# Patient Record
(demographics unavailable — no encounter records)

---

## 2025-05-21 NOTE — DISCUSSION/SUMMARY
[FreeTextEntry1] : 73-year-old female right-hand-dominant with history of dizziness and chronic cervicalgia radiating to left shoulder with paresthesia dysesthesias without weakness MRA of head and neck unremarkable. MRI brain with IACs unremarkable.  MRI of cervical spine shows multilevel degenerative changes C5-6 most significantly.  Having headaches, likely cervicalgia.     Management of neck muscle spasm and cervicalgia:  Physical therapy outpatient including postural modification, strengthening, and range of motion exercises.  Education on proper sitting and sleeping position. Can consider chiropractic services with caution of manipulation of the neck. Massage therapy regularly. Alternating heat with cold packs Stretching exercises including neck rotation, neck tilting, scapular retraction, chin retraction, with particular focus on the trapezius and rotator cuff.  Can also consider muscle relaxant such as Flexeril 5mg PRN TID with caution to sedation and medication interactions.  Yoga, Mika chi, or CBT are other useful therapies.  Can consider needling or trigger point injection inpatient or outpatient.  2. Continue Physical therapy for the neck   3. tizanidine 2 mg 1 to 2 tablets at bedtime for cervical muscle spasms. She also has a compound cream that was prescribed, or can try diclofenac cream.   4. Continue magnesium, she takes slow-mag. Try for at least 400mg.     Return to clinic in 6 months for follow-up evaluation 3-4 months

## 2025-05-21 NOTE — PHYSICAL EXAM
[General Appearance - Alert] : alert [General Appearance - In No Acute Distress] : in no acute distress [General Appearance - Well Nourished] : well nourished [General Appearance - Well Developed] : well developed [Oriented To Time, Place, And Person] : oriented to person, place, and time [Affect] : the affect was normal [Mood] : the mood was normal [Cranial Nerves Optic (II)] : visual acuity intact bilaterally,  visual fields full to confrontation, pupils equal round and reactive to light [Cranial Nerves Oculomotor (III)] : extraocular motion intact [Cranial Nerves Trigeminal (V)] : facial sensation intact symmetrically [Cranial Nerves Facial (VII)] : face symmetrical [Cranial Nerves Vestibulocochlear (VIII)] : hearing was intact bilaterally [Cranial Nerves Glossopharyngeal (IX)] : tongue and palate midline [Cranial Nerves Accessory (XI - Cranial And Spinal)] : head turning and shoulder shrug symmetric [Cranial Nerves Hypoglossal (XII)] : there was no tongue deviation with protrusion [Motor Tone] : muscle tone was normal in all four extremities [Motor Strength] : muscle strength was normal in all four extremities [Involuntary Movements] : no involuntary movements were seen [No Muscle Atrophy] : normal bulk in all four extremities [Motor Handedness Right-Handed] : the patient is right hand dominant [Sensation Tactile Decrease] : light touch was intact [Abnormal Walk] : normal gait [Balance] : balance was intact [2+] : Patella left 2+ [PERRL With Normal Accommodation] : pupils were equal in size, round, reactive to light, with normal accommodation [Extraocular Movements] : extraocular movements were intact [Hearing Threshold Finger Rub Not [place]] : hearing was normal [Neck Appearance] : the appearance of the neck was normal [Exaggerated Use Of Accessory Muscles For Inspiration] : no accessory muscle use [Abdomen Soft] : soft [Abdomen Tenderness] : non-tender [Nail Clubbing] : no clubbing  or cyanosis of the fingernails [Skin Color & Pigmentation] : normal skin color and pigmentation [] : no rash [Skin Lesions] : no skin lesions [Motor Strength Upper Extremities Bilaterally] : strength was normal in both upper extremities [Motor Strength Lower Extremities Bilaterally] : strength was normal in both lower extremities [Romberg's Sign] : Romberg's sign was negtive [Tremor] : no tremor present

## 2025-05-21 NOTE — DATA REVIEWED
[de-identified] : MRI head non-contrast 7/1/2024: Anterior circulation normal flow related signal is identified in the intracranial segment of both internal carotid arteries and their major branches.  There is no glossy stenotic or occlusive lesion.  There is no evidence of aneurysm.  Posterior circulation there is normal flow related signal identified in the vertebrobasilar system.  There is no grossly stenotic or occlusive lesion.  There is no evidence of aneurysm.  There is fetal circulation of the posterior cerebral arteries.  The right vertebral artery terminates at the right posterior inferior cerebellar artery.  Diffusion-weighted imaging of the brain demonstrates no evidence of acute infarction.  Impression normal MRA of the head.  MRA neck with and without IV contrast 7/1/2024: Carotid arteries there is no evidence of significant stenosis involving the visualized portion of the common or internal carotid arteries on either side.  Vertebral arteries flow in the normal antegrade direction in the vertebral arteries bilaterally.  No significant stenosis is appreciated to the visualized portion of the vertebral arteries.  The right vertebral artery terminates at the right posterior inferior cerebellar artery.  There is a dominant left vertebral artery.  Aortic arch there is a standard configuration to the aortic arch without significant stenosis at the origins of the great vessels.  Other findings diffusion-weighted imaging of the brain was obtained and demonstrates no evidence of acute infarction.  Impression normal MRA of neck.  MRI cervical spine noncontrast 6/29/2024: No fractures lytic or blastic disease is demonstrated.  The cervical spinal cord is normal in signal.  No abnormal intradural filling defects are demonstrated.  At C2-3 the canal and foramen are clear, unchanged since the prior exam.  At C3-4 hypertrophic degenerative facet arthritis on the left side is present and may be causing and mild left foraminal stenosis.  The canal and the right foramen are clear.  The appearance is unchanged since the previous exam.  At C4-5 heterotrophic degenerative facet arthritis on the left side is present and is associated with a very subtle degenerative spondylolisthesis of C4 anteriorly on C5.  Nevertheless the canal and the foramen are clear and the appearance is unchanged since the previous exam.  At C5-6 the disc is reduced in height.  Degenerative disc osteophyte projects across the ventral epidural space and cause a ventral epidural defect on the thecal sac.  The degenerative disease also causes bilateral high-grade foraminal stenosis.  The appearance is unchanged since prior exam.  C6-7 subtle grade 1 degenerative spondylolisthesis and C6 anteriorly on C7  is present.  Visualized the canal and foramen are clear and the appearance is unchanged since previous exam.  At C7-T1 the canal and the foramen are clear.  The appearance is unchanged since the previous exam.  Impression multilevel degenerative disc disease most notably at C5-6.  MRI brain and IACs with and without IV contrast 12/28/2023: 7th and 8th cranial nerves and the cerebellopontine angle cisterns appear intact bilaterally.  No abnormal signal or enhancement is noted to suggest a vestibular schwannoma.  There are no findings to suggest an inner ear abnormality.  The brain parenchyma is unremarkable.  There is no acute intracranial hemorrhage or mass lesion.  No restricted diffusion is seen to suggest acute infarction.  Extra-axial spaces are unremarkable.  Ventricles and sulci are normal for age.  Major cerebral blood vessels flow voids are intact.  Paranasal sinuses mastoid air cells minimal opacification in the left mastoid tip.  Orbits are unremarkable impression normal brain MRI.

## 2025-05-21 NOTE — HISTORY OF PRESENT ILLNESS
[FreeTextEntry1] : May 21: 73-year-old woman here for follow-up.  Previously seen July 2024 by Miguel Hackett: She wants to know why she had the vertigo. She frequently has headaches, almost every day. She goes to PT and does exercises daily. She has some symptoms of nasal congestion. She has some frontal pressure today. Her sister has had two episodes of TGA, and she is concerned that she will have this. She will occasionally have numbness in the 3rd and 4th digits, primarily in the morning. She sleeps on a memory foam and has a seed pillow, this helps keep her comfortable.   73-year-old female right-hand-dominant returns to clinic with  for follow-up evaluation for dizziness and chronic neck pain and stiffness.  Patient states the dizziness has resolved. Cervicalgia is located along the midline of the C-spine with mild radiation to L shoulder.  Pain is described as a dull pain that waxes and wanes with activity with severity rated 5-6/10. Associated symptoms include paresthesia described as numbness and tingling, dysesthesias with occasional burning and electrical shocks without weakness. Patient has participated in physical therapy in the past with palliative relief.  MRI head non-contrast 7/1/2024: Impression normal MRA of the head. MRA neck with and without IV contrast 7/1/2024: Impression normal MRA of neck. MRI cervical spine non-contrast 6/29/2024: Impression multilevel degenerative disc disease most notably at C5-6. MRI brain and IACs with and without IV contrast 12/28/2023: impression normal brain MRI.

## 2025-05-21 NOTE — REVIEW OF SYSTEMS
[Numbness] : numbness [Tingling] : tingling [Abnormal Sensation] : an abnormal sensation [Dizziness] : dizziness [Vertigo] : vertigo [Arthralgias] : arthralgias [Joint Pain] : joint pain [Fever] : no fever [Chills] : no chills [Feeling Tired] : not feeling tired [Confused or Disoriented] : no confusion [Memory Lapses or Loss] : no memory loss [Decr. Concentrating Ability] : no decrease in concentrating ability [Difficulty with Language] : no ~M difficulty with language [Facial Weakness] : no facial weakness [Arm Weakness] : no arm weakness [Hand Weakness] : no hand weakness [Leg Weakness] : no leg weakness [Hypersensitivity] : no hypersensitivity [Seizures] : no convulsions [Fainting] : no fainting [Lightheadedness] : no lightheadedness [Migraine Headache] : no migraine headache [Difficulty Walking] : no difficulty walking [Inability to Walk] : able to walk [Sleep Disturbances] : no sleep disturbances [Anxiety] : no anxiety [Depression] : no depression [Eye Pain] : no eye pain [Loss Of Hearing] : no hearing loss [Chest Pain] : no chest pain [Palpitations] : no palpitations [Shortness Of Breath] : no shortness of breath [Abdominal Pain] : no abdominal pain [Dysuria] : no dysuria [Skin Lesions] : no skin lesions [Skin Wound] : no skin wound [Easy Bleeding] : no tendency for easy bleeding [Easy Bruising] : no tendency for easy bruising

## 2025-07-16 NOTE — PHYSICAL EXAM
[General Appearance - Alert] : alert [General Appearance - In No Acute Distress] : in no acute distress [General Appearance - Well Nourished] : well nourished [General Appearance - Well Developed] : well developed [Oriented To Time, Place, And Person] : oriented to person, place, and time [Affect] : the affect was normal [Mood] : the mood was normal [Cranial Nerves Optic (II)] : visual acuity intact bilaterally,  visual fields full to confrontation, pupils equal round and reactive to light [Cranial Nerves Oculomotor (III)] : extraocular motion intact [Cranial Nerves Trigeminal (V)] : facial sensation intact symmetrically [Cranial Nerves Facial (VII)] : face symmetrical [Cranial Nerves Vestibulocochlear (VIII)] : hearing was intact bilaterally [Cranial Nerves Glossopharyngeal (IX)] : tongue and palate midline [Cranial Nerves Accessory (XI - Cranial And Spinal)] : head turning and shoulder shrug symmetric [Cranial Nerves Hypoglossal (XII)] : there was no tongue deviation with protrusion [Motor Tone] : muscle tone was normal in all four extremities [Motor Strength] : muscle strength was normal in all four extremities [Involuntary Movements] : no involuntary movements were seen [No Muscle Atrophy] : normal bulk in all four extremities [Motor Handedness Right-Handed] : the patient is right hand dominant [Sensation Tactile Decrease] : light touch was intact [Abnormal Walk] : normal gait [Balance] : balance was intact [2+] : Patella left 2+ [PERRL With Normal Accommodation] : pupils were equal in size, round, reactive to light, with normal accommodation [Extraocular Movements] : extraocular movements were intact [Hearing Threshold Finger Rub Not [place]] : hearing was normal [Neck Appearance] : the appearance of the neck was normal [Exaggerated Use Of Accessory Muscles For Inspiration] : no accessory muscle use [Abdomen Soft] : soft [Abdomen Tenderness] : non-tender [Nail Clubbing] : no clubbing  or cyanosis of the fingernails [Skin Color & Pigmentation] : normal skin color and pigmentation [] : no rash [Skin Lesions] : no skin lesions [Motor Strength Upper Extremities Bilaterally] : strength was normal in both upper extremities [Motor Strength Lower Extremities Bilaterally] : strength was normal in both lower extremities [Romberg's Sign] : Romberg's sign was negtive [Tremor] : no tremor present

## 2025-07-16 NOTE — ASSESSMENT
[FreeTextEntry1] : 74-year-old female right-hand-dominant with history of dizziness and chronic cervicalgia radiating to left shoulder with paresthesia dysesthesias without weakness. MRI of cervical spine shows multilevel degenerative changes C5-6 most significantly. She has significant neck stiffness and muscle spasm likely causing cervicalgia. She has intermittent brief episodes of dizziness. Discussed this could be related to a number of things.    Management of neck muscle spasm and cervicalgia:  Physical therapy outpatient including postural modification, strengthening, and range of motion exercises. Education on proper sitting and sleeping position. Can consider chiropractic services with caution of manipulation of the neck. Massage therapy regularly. Alternating heat with cold packs Stretching exercises including neck rotation, neck tilting, scapular retraction, chin retraction, with particular focus on the trapezius and rotator cuff. Can also consider muscle relaxant such as Flexeril 5mg PRN TID with caution to sedation and medication interactions. Yoga, Mika chi, or CBT are other useful therapies. Can consider needling or trigger point injection inpatient or outpatient.  2. Continue Physical therapy for the neck. Acupuncture and massage gun regularly  3. tizanidine 2 mg 1 to 2 tablets at bedtime for cervical muscle spasms. She also has a compound cream that was prescribed, or can try diclofenac cream.  4. Continue magnesium, she takes slow-mag 400mg.  Follow up in 3-4 months

## 2025-07-16 NOTE — DATA REVIEWED
[de-identified] : MRI head non-contrast 7/1/2024: Anterior circulation normal flow related signal is identified in the intracranial segment of both internal carotid arteries and their major branches.  There is no glossy stenotic or occlusive lesion.  There is no evidence of aneurysm.  Posterior circulation there is normal flow related signal identified in the vertebrobasilar system.  There is no grossly stenotic or occlusive lesion.  There is no evidence of aneurysm.  There is fetal circulation of the posterior cerebral arteries.  The right vertebral artery terminates at the right posterior inferior cerebellar artery.  Diffusion-weighted imaging of the brain demonstrates no evidence of acute infarction.  Impression normal MRA of the head.  MRA neck with and without IV contrast 7/1/2024: Carotid arteries there is no evidence of significant stenosis involving the visualized portion of the common or internal carotid arteries on either side.  Vertebral arteries flow in the normal antegrade direction in the vertebral arteries bilaterally.  No significant stenosis is appreciated to the visualized portion of the vertebral arteries.  The right vertebral artery terminates at the right posterior inferior cerebellar artery.  There is a dominant left vertebral artery.  Aortic arch there is a standard configuration to the aortic arch without significant stenosis at the origins of the great vessels.  Other findings diffusion-weighted imaging of the brain was obtained and demonstrates no evidence of acute infarction.  Impression normal MRA of neck.  MRI cervical spine noncontrast 6/29/2024: No fractures lytic or blastic disease is demonstrated.  The cervical spinal cord is normal in signal.  No abnormal intradural filling defects are demonstrated.  At C2-3 the canal and foramen are clear, unchanged since the prior exam.  At C3-4 hypertrophic degenerative facet arthritis on the left side is present and may be causing and mild left foraminal stenosis.  The canal and the right foramen are clear.  The appearance is unchanged since the previous exam.  At C4-5 heterotrophic degenerative facet arthritis on the left side is present and is associated with a very subtle degenerative spondylolisthesis of C4 anteriorly on C5.  Nevertheless the canal and the foramen are clear and the appearance is unchanged since the previous exam.  At C5-6 the disc is reduced in height.  Degenerative disc osteophyte projects across the ventral epidural space and cause a ventral epidural defect on the thecal sac.  The degenerative disease also causes bilateral high-grade foraminal stenosis.  The appearance is unchanged since prior exam.  C6-7 subtle grade 1 degenerative spondylolisthesis and C6 anteriorly on C7  is present.  Visualized the canal and foramen are clear and the appearance is unchanged since previous exam.  At C7-T1 the canal and the foramen are clear.  The appearance is unchanged since the previous exam.  Impression multilevel degenerative disc disease most notably at C5-6.  MRI brain and IACs with and without IV contrast 12/28/2023: 7th and 8th cranial nerves and the cerebellopontine angle cisterns appear intact bilaterally.  No abnormal signal or enhancement is noted to suggest a vestibular schwannoma.  There are no findings to suggest an inner ear abnormality.  The brain parenchyma is unremarkable.  There is no acute intracranial hemorrhage or mass lesion.  No restricted diffusion is seen to suggest acute infarction.  Extra-axial spaces are unremarkable.  Ventricles and sulci are normal for age.  Major cerebral blood vessels flow voids are intact.  Paranasal sinuses mastoid air cells minimal opacification in the left mastoid tip.  Orbits are unremarkable impression normal brain MRI.

## 2025-07-16 NOTE — DATA REVIEWED
[de-identified] : MRI head non-contrast 7/1/2024: Anterior circulation normal flow related signal is identified in the intracranial segment of both internal carotid arteries and their major branches.  There is no glossy stenotic or occlusive lesion.  There is no evidence of aneurysm.  Posterior circulation there is normal flow related signal identified in the vertebrobasilar system.  There is no grossly stenotic or occlusive lesion.  There is no evidence of aneurysm.  There is fetal circulation of the posterior cerebral arteries.  The right vertebral artery terminates at the right posterior inferior cerebellar artery.  Diffusion-weighted imaging of the brain demonstrates no evidence of acute infarction.  Impression normal MRA of the head.  MRA neck with and without IV contrast 7/1/2024: Carotid arteries there is no evidence of significant stenosis involving the visualized portion of the common or internal carotid arteries on either side.  Vertebral arteries flow in the normal antegrade direction in the vertebral arteries bilaterally.  No significant stenosis is appreciated to the visualized portion of the vertebral arteries.  The right vertebral artery terminates at the right posterior inferior cerebellar artery.  There is a dominant left vertebral artery.  Aortic arch there is a standard configuration to the aortic arch without significant stenosis at the origins of the great vessels.  Other findings diffusion-weighted imaging of the brain was obtained and demonstrates no evidence of acute infarction.  Impression normal MRA of neck.  MRI cervical spine noncontrast 6/29/2024: No fractures lytic or blastic disease is demonstrated.  The cervical spinal cord is normal in signal.  No abnormal intradural filling defects are demonstrated.  At C2-3 the canal and foramen are clear, unchanged since the prior exam.  At C3-4 hypertrophic degenerative facet arthritis on the left side is present and may be causing and mild left foraminal stenosis.  The canal and the right foramen are clear.  The appearance is unchanged since the previous exam.  At C4-5 heterotrophic degenerative facet arthritis on the left side is present and is associated with a very subtle degenerative spondylolisthesis of C4 anteriorly on C5.  Nevertheless the canal and the foramen are clear and the appearance is unchanged since the previous exam.  At C5-6 the disc is reduced in height.  Degenerative disc osteophyte projects across the ventral epidural space and cause a ventral epidural defect on the thecal sac.  The degenerative disease also causes bilateral high-grade foraminal stenosis.  The appearance is unchanged since prior exam.  C6-7 subtle grade 1 degenerative spondylolisthesis and C6 anteriorly on C7  is present.  Visualized the canal and foramen are clear and the appearance is unchanged since previous exam.  At C7-T1 the canal and the foramen are clear.  The appearance is unchanged since the previous exam.  Impression multilevel degenerative disc disease most notably at C5-6.  MRI brain and IACs with and without IV contrast 12/28/2023: 7th and 8th cranial nerves and the cerebellopontine angle cisterns appear intact bilaterally.  No abnormal signal or enhancement is noted to suggest a vestibular schwannoma.  There are no findings to suggest an inner ear abnormality.  The brain parenchyma is unremarkable.  There is no acute intracranial hemorrhage or mass lesion.  No restricted diffusion is seen to suggest acute infarction.  Extra-axial spaces are unremarkable.  Ventricles and sulci are normal for age.  Major cerebral blood vessels flow voids are intact.  Paranasal sinuses mastoid air cells minimal opacification in the left mastoid tip.  Orbits are unremarkable impression normal brain MRI.

## 2025-07-16 NOTE — HISTORY OF PRESENT ILLNESS
[FreeTextEntry1] : July 16, 2025: She had an episode of dizziness or feeling out of it. She was putting cat food down. She felt dizzy, had to stand still for a second, went away quickly.  She was having nasal congestion, taking honey daily helped. Her headaches also stopped. She mouth breaths at night and grinds her teeth. Per  she almost never snores. She wakes with dry mouth. She hasn't had a chance to try massage for her neck pain. Her PCP had ordered a compound cream for her but she has not heard back yet.   May 21: 73-year-old woman here for follow-up.  Previously seen July 2024 by Miguel Hackett: She wants to know why she had the vertigo. She frequently has headaches, almost every day. She goes to PT and does exercises daily. She has some symptoms of nasal congestion. She has some frontal pressure today. Her sister has had two episodes of TGA, and she is concerned that she will have this. She will occasionally have numbness in the 3rd and 4th digits, primarily in the morning. She sleeps on a memory foam and has a seed pillow, this helps keep her comfortable. Her neck is still stiff but she has a bit more flexibility doing PT. Per PCP sent in a cream from a compounding pharmacy. She currently uses voltaren cream.   73-year-old female right-hand-dominant returns to clinic with  for follow-up evaluation for dizziness and chronic neck pain and stiffness.  Patient states the dizziness has resolved. Cervicalgia is located along the midline of the C-spine with mild radiation to L shoulder.  Pain is described as a dull pain that waxes and wanes with activity with severity rated 5-6/10. Associated symptoms include paresthesia described as numbness and tingling, dysesthesias with occasional burning and electrical shocks without weakness. Patient has participated in physical therapy in the past with palliative relief.  MRI head non-contrast 7/1/2024: Impression normal MRA of the head. MRA neck with and without IV contrast 7/1/2024: Impression normal MRA of neck. MRI cervical spine non-contrast 6/29/2024: Impression multilevel degenerative disc disease most notably at C5-6. MRI brain and IACs with and without IV contrast 12/28/2023: impression normal brain MRI.

## 2025-07-16 NOTE — PHYSICAL EXAM
[General Appearance - Alert] : alert [General Appearance - In No Acute Distress] : in no acute distress [General Appearance - Well Nourished] : well nourished [General Appearance - Well Developed] : well developed [Oriented To Time, Place, And Person] : oriented to person, place, and time [Affect] : the affect was normal [Mood] : the mood was normal [Cranial Nerves Optic (II)] : visual acuity intact bilaterally,  visual fields full to confrontation, pupils equal round and reactive to light [Cranial Nerves Oculomotor (III)] : extraocular motion intact [Cranial Nerves Trigeminal (V)] : facial sensation intact symmetrically [Cranial Nerves Facial (VII)] : face symmetrical [Cranial Nerves Vestibulocochlear (VIII)] : hearing was intact bilaterally [Cranial Nerves Glossopharyngeal (IX)] : tongue and palate midline [Cranial Nerves Accessory (XI - Cranial And Spinal)] : head turning and shoulder shrug symmetric [Cranial Nerves Hypoglossal (XII)] : there was no tongue deviation with protrusion [Motor Tone] : muscle tone was normal in all four extremities [Motor Strength] : muscle strength was normal in all four extremities [Involuntary Movements] : no involuntary movements were seen [No Muscle Atrophy] : normal bulk in all four extremities [Motor Handedness Right-Handed] : the patient is right hand dominant [Sensation Tactile Decrease] : light touch was intact [Abnormal Walk] : normal gait [Balance] : balance was intact [2+] : Patella left 2+ [PERRL With Normal Accommodation] : pupils were equal in size, round, reactive to light, with normal accommodation [Extraocular Movements] : extraocular movements were intact [Hearing Threshold Finger Rub Not [place]] : hearing was normal [Neck Appearance] : the appearance of the neck was normal [Exaggerated Use Of Accessory Muscles For Inspiration] : no accessory muscle use [Abdomen Soft] : soft [Abdomen Tenderness] : non-tender [Nail Clubbing] : no clubbing  or cyanosis of the fingernails [Skin Color & Pigmentation] : normal skin color and pigmentation [] : no rash [Skin Lesions] : no skin lesions [Motor Strength Upper Extremities Bilaterally] : strength was normal in both upper extremities [Motor Strength Lower Extremities Bilaterally] : strength was normal in both lower extremities [Romberg's Sign] : Romberg's sign was negtive [Tremor] : no tremor present